# Patient Record
Sex: MALE | Race: BLACK OR AFRICAN AMERICAN | NOT HISPANIC OR LATINO | URBAN - METROPOLITAN AREA
[De-identification: names, ages, dates, MRNs, and addresses within clinical notes are randomized per-mention and may not be internally consistent; named-entity substitution may affect disease eponyms.]

---

## 2022-11-08 ENCOUNTER — OFFICE VISIT (OUTPATIENT)
Dept: URBAN - METROPOLITAN AREA CLINIC 48 | Facility: CLINIC | Age: 27
End: 2022-11-08

## 2022-12-05 ENCOUNTER — OFFICE VISIT (OUTPATIENT)
Dept: URBAN - METROPOLITAN AREA CLINIC 48 | Facility: CLINIC | Age: 27
End: 2022-12-05
Payer: COMMERCIAL

## 2022-12-05 ENCOUNTER — LAB OUTSIDE AN ENCOUNTER (OUTPATIENT)
Dept: URBAN - METROPOLITAN AREA CLINIC 48 | Facility: CLINIC | Age: 27
End: 2022-12-05

## 2022-12-05 VITALS
SYSTOLIC BLOOD PRESSURE: 126 MMHG | TEMPERATURE: 97.7 F | BODY MASS INDEX: 25.68 KG/M2 | HEIGHT: 69 IN | HEART RATE: 51 BPM | DIASTOLIC BLOOD PRESSURE: 77 MMHG | WEIGHT: 173.4 LBS

## 2022-12-05 DIAGNOSIS — K64.4 ANAL SKIN TAG: ICD-10-CM

## 2022-12-05 DIAGNOSIS — K92.1 HEMATOCHEZIA: ICD-10-CM

## 2022-12-05 DIAGNOSIS — K64.2 GRADE III HEMORRHOIDS: ICD-10-CM

## 2022-12-05 PROCEDURE — 99204 OFFICE O/P NEW MOD 45 MIN: CPT | Performed by: INTERNAL MEDICINE

## 2022-12-05 RX ORDER — HYDROCORTISONE 25 MG/G
CREAM TOPICAL
Qty: 30 GRAM | Status: ACTIVE | COMMUNITY

## 2022-12-05 RX ORDER — DOXYCYCLINE HYCLATE 100 MG/1
TABLET ORAL
Qty: 18 TABLET | Status: ACTIVE | COMMUNITY

## 2022-12-05 NOTE — HPI-TODAY'S VISIT:
26 yo M presents for an evaluation of hematochezia with rectal fullness. The patient mentions that he has been experiencing scant hematochezia with wiping intermittently. He states that he doesn't necessarily have pain but thinks that he has a hemorrhoid that is bothersome. Denies constipation, diarrhea or straining on the toilet. Denies familial GI disease.

## 2022-12-05 NOTE — PHYSICAL EXAM GASTROINTESTINAL
Abdomen , soft, nontender, nondistended , no guarding or rigidity , no masses palpable , normal bowel sounds. Rectal deferred. , Liver and Spleen , no hepatomegaly present , no hepatosplenomegaly , liver nontender , spleen not palpable

## 2022-12-28 ENCOUNTER — OFFICE VISIT (OUTPATIENT)
Dept: URBAN - METROPOLITAN AREA SURGERY CENTER 28 | Facility: SURGERY CENTER | Age: 27
End: 2022-12-28
Payer: COMMERCIAL

## 2022-12-28 DIAGNOSIS — K92.1 ACUTE MELENA: ICD-10-CM

## 2022-12-28 DIAGNOSIS — K52.9 ACUTE AND CHRONIC COLITIS: ICD-10-CM

## 2022-12-28 DIAGNOSIS — K63.5 BENIGN COLON POLYP: ICD-10-CM

## 2022-12-28 PROCEDURE — G8907 PT DOC NO EVENTS ON DISCHARG: HCPCS | Performed by: INTERNAL MEDICINE

## 2022-12-28 PROCEDURE — 45380 COLONOSCOPY AND BIOPSY: CPT | Performed by: INTERNAL MEDICINE

## 2022-12-28 RX ORDER — DOXYCYCLINE HYCLATE 100 MG/1
TABLET ORAL
Qty: 18 TABLET | Status: ACTIVE | COMMUNITY

## 2022-12-28 RX ORDER — HYDROCORTISONE 25 MG/G
CREAM TOPICAL
Qty: 30 GRAM | Status: ACTIVE | COMMUNITY

## 2023-03-13 ENCOUNTER — DASHBOARD ENCOUNTERS (OUTPATIENT)
Age: 28
End: 2023-03-13

## 2023-03-14 ENCOUNTER — OFFICE VISIT (OUTPATIENT)
Dept: URBAN - METROPOLITAN AREA CLINIC 48 | Facility: CLINIC | Age: 28
End: 2023-03-14

## 2023-03-14 RX ORDER — HYDROCORTISONE 25 MG/G
CREAM TOPICAL
Qty: 30 GRAM | Status: ACTIVE | COMMUNITY

## 2023-03-14 RX ORDER — DOXYCYCLINE HYCLATE 100 MG/1
1 TABLET TABLET ORAL ONCE A DAY
Qty: 18 TABLET | Status: ACTIVE | COMMUNITY

## 2023-03-14 NOTE — HPI-TODAY'S VISIT:
28 yo M presents for follow up on hematochezia with rectal fullness for which he was seen in December. He reported scant hematochezia with wiping intermittently. Denied constipation, diarrhea or straining on the toilet. Denied familial GI disease. Colonoscopy in December revealed perianal skin tags, hypertrophied anal papilla, internal hemorrhoids for which topical hydrocortisone was advised, and a 3 mm cecal polyp; path from polyp showed polypoid colonic mucosa with focal active colitis which was non-specific, and can be seen with slowly resolving self limited colitis or Crohn's disease.